# Patient Record
Sex: FEMALE | Race: BLACK OR AFRICAN AMERICAN | NOT HISPANIC OR LATINO | Employment: OTHER | URBAN - METROPOLITAN AREA
[De-identification: names, ages, dates, MRNs, and addresses within clinical notes are randomized per-mention and may not be internally consistent; named-entity substitution may affect disease eponyms.]

---

## 2020-09-16 ENCOUNTER — OFFICE VISIT (OUTPATIENT)
Dept: CARDIOLOGY | Facility: CLINIC | Age: 75
End: 2020-09-16
Payer: MEDICARE

## 2020-09-16 VITALS
WEIGHT: 130.06 LBS | DIASTOLIC BLOOD PRESSURE: 70 MMHG | HEART RATE: 70 BPM | SYSTOLIC BLOOD PRESSURE: 140 MMHG | HEIGHT: 61 IN | BODY MASS INDEX: 24.55 KG/M2

## 2020-09-16 DIAGNOSIS — I10 ESSENTIAL HYPERTENSION: ICD-10-CM

## 2020-09-16 DIAGNOSIS — E78.2 MIXED HYPERLIPIDEMIA: Primary | ICD-10-CM

## 2020-09-16 DIAGNOSIS — I73.9 PVD (PERIPHERAL VASCULAR DISEASE): ICD-10-CM

## 2020-09-16 DIAGNOSIS — Z86.73 HISTORY OF CEREBROVASCULAR ACCIDENT: ICD-10-CM

## 2020-09-16 PROBLEM — E78.5 HYPERLIPIDEMIA: Status: ACTIVE | Noted: 2018-05-01

## 2020-09-16 PROCEDURE — 99214 PR OFFICE/OUTPT VISIT, EST, LEVL IV, 30-39 MIN: ICD-10-PCS | Mod: S$PBB,25,, | Performed by: INTERNAL MEDICINE

## 2020-09-16 PROCEDURE — 93010 ELECTROCARDIOGRAM REPORT: CPT | Mod: 76,,, | Performed by: INTERNAL MEDICINE

## 2020-09-16 PROCEDURE — 99999 PR PBB SHADOW E&M-EST. PATIENT-LVL III: CPT | Mod: PBBFAC,,, | Performed by: INTERNAL MEDICINE

## 2020-09-16 PROCEDURE — 99214 OFFICE O/P EST MOD 30 MIN: CPT | Mod: S$PBB,25,, | Performed by: INTERNAL MEDICINE

## 2020-09-16 PROCEDURE — 93010 EKG 12-LEAD: ICD-10-PCS | Mod: 76,,, | Performed by: INTERNAL MEDICINE

## 2020-09-16 PROCEDURE — 93010 ELECTROCARDIOGRAM REPORT: CPT | Mod: S$PBB,,, | Performed by: INTERNAL MEDICINE

## 2020-09-16 PROCEDURE — 93005 ELECTROCARDIOGRAM TRACING: CPT

## 2020-09-16 PROCEDURE — 93005 ELECTROCARDIOGRAM TRACING: CPT | Mod: PBBFAC | Performed by: INTERNAL MEDICINE

## 2020-09-16 PROCEDURE — 99213 OFFICE O/P EST LOW 20 MIN: CPT | Mod: PBBFAC,25 | Performed by: INTERNAL MEDICINE

## 2020-09-16 PROCEDURE — 99999 PR PBB SHADOW E&M-EST. PATIENT-LVL III: ICD-10-PCS | Mod: PBBFAC,,, | Performed by: INTERNAL MEDICINE

## 2020-09-16 RX ORDER — TIMOLOL MALEATE 5 MG/ML
SOLUTION/ DROPS OPHTHALMIC
COMMUNITY
Start: 2020-09-04 | End: 2021-03-16

## 2020-09-16 RX ORDER — BRINZOLAMIDE 10 MG/ML
SUSPENSION/ DROPS OPHTHALMIC
COMMUNITY
Start: 2020-07-22 | End: 2021-03-16

## 2020-09-16 RX ORDER — AMLODIPINE BESYLATE 10 MG/1
10 TABLET ORAL DAILY
COMMUNITY
Start: 2020-08-22 | End: 2022-07-12

## 2020-09-16 RX ORDER — CLOPIDOGREL BISULFATE 75 MG/1
75 TABLET ORAL DAILY
COMMUNITY
Start: 2020-09-04 | End: 2023-12-13

## 2020-09-16 RX ORDER — MULTIVITAMIN
1 TABLET ORAL DAILY
COMMUNITY
End: 2023-12-13

## 2020-09-16 RX ORDER — LOSARTAN POTASSIUM 25 MG/1
25 TABLET ORAL DAILY
COMMUNITY
Start: 2020-08-21 | End: 2022-01-18

## 2020-09-16 RX ORDER — ATORVASTATIN CALCIUM 40 MG/1
40 TABLET, FILM COATED ORAL NIGHTLY
COMMUNITY
Start: 2020-08-21 | End: 2022-01-18

## 2020-09-16 RX ORDER — ASPIRIN 81 MG/1
81 TABLET ORAL DAILY
COMMUNITY
End: 2023-06-13

## 2020-09-16 NOTE — PROGRESS NOTES
Cardiology    9/16/2020  10:39 AM    Problem list  Patient Active Problem List   Diagnosis    Essential hypertension    History of cerebrovascular accident    Hyperlipidemia    PVD (peripheral vascular disease)       CC:  PVD    HPI:  Patient was last seen in November 2019.  In January 2020, she had a Doppler done which showed occluded cross femoral bypass.  Angiogram was done in January confirming occluded cross femoral bypass graft.  She was referred to Dr. Altamirano who saw her on 3/4/20 and recommended cilostazol with exercise regimen.  She has no new complaints.  She is able to walk at least 1 block before claudication.  She denies any rest claudication.  She still smokes intermittently.    Medications  Current Outpatient Medications   Medication Sig Dispense Refill    amLODIPine (NORVASC) 10 MG tablet 10 mg once daily.      aspirin (ECOTRIN) 81 MG EC tablet Take 81 mg by mouth once daily.      atorvastatin (LIPITOR) 40 MG tablet 40 mg every evening.      AZOPT 1 % ophthalmic suspension       clopidogreL (PLAVIX) 75 mg tablet 75 mg once daily.      losartan (COZAAR) 25 MG tablet 25 mg once daily.      multivitamin (ONE DAILY MULTIVITAMIN) per tablet Take 1 tablet by mouth once daily.      timolol maleate 0.5% (TIMOPTIC) 0.5 % Drop        No current facility-administered medications for this visit.       Prior to Admission medications    Medication Sig Start Date End Date Taking? Authorizing Provider   amLODIPine (NORVASC) 10 MG tablet 10 mg once daily. 8/22/20  Yes Historical Provider   aspirin (ECOTRIN) 81 MG EC tablet Take 81 mg by mouth once daily.   Yes Historical Provider   atorvastatin (LIPITOR) 40 MG tablet 40 mg every evening. 8/21/20  Yes Historical Provider   AZOPT 1 % ophthalmic suspension  7/22/20  Yes Historical Provider   clopidogreL (PLAVIX) 75 mg tablet 75 mg once daily. 9/4/20  Yes Historical Provider   losartan (COZAAR) 25 MG tablet 25 mg once daily. 8/21/20  Yes Historical  Provider   multivitamin (ONE DAILY MULTIVITAMIN) per tablet Take 1 tablet by mouth once daily.   Yes Historical Provider   timolol maleate 0.5% (TIMOPTIC) 0.5 % Drop  9/4/20  Yes Historical Provider         History  No past medical history on file.  No past surgical history on file.  Social History     Socioeconomic History    Marital status:      Spouse name: Not on file    Number of children: Not on file    Years of education: Not on file    Highest education level: Not on file   Occupational History    Not on file   Social Needs    Financial resource strain: Not on file    Food insecurity     Worry: Not on file     Inability: Not on file    Transportation needs     Medical: Not on file     Non-medical: Not on file   Tobacco Use    Smoking status: Current Some Day Smoker     Types: Cigarettes    Smokeless tobacco: Never Used   Substance and Sexual Activity    Alcohol use: Never     Frequency: Never    Drug use: Not on file    Sexual activity: Not on file   Lifestyle    Physical activity     Days per week: Not on file     Minutes per session: Not on file    Stress: Not on file   Relationships    Social connections     Talks on phone: Not on file     Gets together: Not on file     Attends Oriental orthodox service: Not on file     Active member of club or organization: Not on file     Attends meetings of clubs or organizations: Not on file     Relationship status: Not on file   Other Topics Concern    Not on file   Social History Narrative    Not on file         Allergies  Review of patient's allergies indicates:   Allergen Reactions    Penicillin          Review of Systems   Review of Systems   Constitution: Negative for decreased appetite, fever and weight loss.   HENT: Negative for congestion and nosebleeds.    Eyes: Negative for double vision, vision loss in left eye, vision loss in right eye and visual disturbance.   Cardiovascular: Negative for chest pain, claudication, cyanosis, dyspnea on  exertion, irregular heartbeat, leg swelling, near-syncope, orthopnea, palpitations, paroxysmal nocturnal dyspnea and syncope.   Respiratory: Negative for cough, hemoptysis, shortness of breath, sleep disturbances due to breathing, snoring, sputum production and wheezing.    Endocrine: Negative for cold intolerance and heat intolerance.   Skin: Negative for nail changes and rash.   Musculoskeletal: Negative for joint pain, muscle cramps, muscle weakness and myalgias.   Gastrointestinal: Negative for change in bowel habit, heartburn, hematemesis, hematochezia, hemorrhoids and melena.   Neurological: Negative for dizziness, focal weakness and headaches.         Physical Exam  Wt Readings from Last 1 Encounters:   09/16/20 59 kg (130 lb 1.1 oz)     BP Readings from Last 3 Encounters:   09/16/20 (!) 140/70     Pulse Readings from Last 1 Encounters:   No data found for Pulse     Body mass index is 24.58 kg/m².    Physical Exam   Constitutional: She is oriented to person, place, and time. She appears well-developed and well-nourished.   Neck: No JVD present. Carotid bruit is not present.   Cardiovascular: Normal rate, regular rhythm, S1 normal and S2 normal.   Pulses:       Carotid pulses are 2+ on the right side and 2+ on the left side.       Radial pulses are 2+ on the right side and 2+ on the left side.        Dorsalis pedis pulses are 0 on the right side and 0 on the left side.   Pulmonary/Chest: Breath sounds normal.   Musculoskeletal:         General: No edema.   Neurological: She is alert and oriented to person, place, and time.   Vitals reviewed.                Assessment  1. PVD (peripheral vascular disease)  Stable.  Occluded cross femoral bypass graft.  Evaluated by vascular surgery and recommended medical management.    2. Mixed hyperlipidemia  Stable    3. Essential hypertension  Unchanged    4. History of cerebrovascular accident  Stable        Plan and Discussion  Continue current medication.  Continue diet  and exercise regimen.  Again, recommend to stop smoking.    Follow Up  6 months    Time spent evaluating and treating patient 20 minutes with >50% of this time being face-to-face.     Johan Forte MD, F.A.C.C, F.S.C.A.I.

## 2021-03-16 ENCOUNTER — OFFICE VISIT (OUTPATIENT)
Dept: CARDIOLOGY | Facility: CLINIC | Age: 76
End: 2021-03-16
Payer: MEDICARE

## 2021-03-16 VITALS
HEART RATE: 94 BPM | SYSTOLIC BLOOD PRESSURE: 134 MMHG | DIASTOLIC BLOOD PRESSURE: 68 MMHG | BODY MASS INDEX: 24.22 KG/M2 | HEIGHT: 61 IN | OXYGEN SATURATION: 97 % | WEIGHT: 128.31 LBS

## 2021-03-16 DIAGNOSIS — I67.2 CEREBRAL ATHEROSCLEROSIS: ICD-10-CM

## 2021-03-16 DIAGNOSIS — I10 ESSENTIAL HYPERTENSION: ICD-10-CM

## 2021-03-16 DIAGNOSIS — Z86.73 HISTORY OF CEREBROVASCULAR ACCIDENT: ICD-10-CM

## 2021-03-16 DIAGNOSIS — I73.9 PVD (PERIPHERAL VASCULAR DISEASE): Primary | ICD-10-CM

## 2021-03-16 DIAGNOSIS — E78.2 MIXED HYPERLIPIDEMIA: ICD-10-CM

## 2021-03-16 PROCEDURE — 93010 EKG 12-LEAD: ICD-10-PCS | Mod: S$PBB,,, | Performed by: INTERNAL MEDICINE

## 2021-03-16 PROCEDURE — 93010 ELECTROCARDIOGRAM REPORT: CPT | Mod: S$PBB,,, | Performed by: INTERNAL MEDICINE

## 2021-03-16 PROCEDURE — 99214 OFFICE O/P EST MOD 30 MIN: CPT | Mod: S$PBB,,, | Performed by: INTERNAL MEDICINE

## 2021-03-16 PROCEDURE — 99999 PR PBB SHADOW E&M-EST. PATIENT-LVL III: ICD-10-PCS | Mod: PBBFAC,,, | Performed by: INTERNAL MEDICINE

## 2021-03-16 PROCEDURE — 99213 OFFICE O/P EST LOW 20 MIN: CPT | Mod: PBBFAC,PN,25 | Performed by: INTERNAL MEDICINE

## 2021-03-16 PROCEDURE — 93005 ELECTROCARDIOGRAM TRACING: CPT | Mod: PBBFAC,PN | Performed by: INTERNAL MEDICINE

## 2021-03-16 PROCEDURE — 99214 PR OFFICE/OUTPT VISIT, EST, LEVL IV, 30-39 MIN: ICD-10-PCS | Mod: S$PBB,,, | Performed by: INTERNAL MEDICINE

## 2021-03-16 PROCEDURE — 99999 PR PBB SHADOW E&M-EST. PATIENT-LVL III: CPT | Mod: PBBFAC,,, | Performed by: INTERNAL MEDICINE

## 2021-03-16 RX ORDER — DORZOLAMIDE HYDROCHLORIDE AND TIMOLOL MALEATE 20; 5 MG/ML; MG/ML
1 SOLUTION/ DROPS OPHTHALMIC
COMMUNITY
End: 2022-01-18

## 2021-03-16 RX ORDER — CILOSTAZOL 50 MG/1
50 TABLET ORAL 2 TIMES DAILY
COMMUNITY
Start: 2020-09-28 | End: 2022-01-18

## 2021-09-03 ENCOUNTER — TELEPHONE (OUTPATIENT)
Dept: CARDIOLOGY | Facility: CLINIC | Age: 76
End: 2021-09-03

## 2022-01-18 ENCOUNTER — OFFICE VISIT (OUTPATIENT)
Dept: CARDIOLOGY | Facility: CLINIC | Age: 77
End: 2022-01-18
Payer: MEDICARE

## 2022-01-18 VITALS
HEART RATE: 66 BPM | BODY MASS INDEX: 23.77 KG/M2 | WEIGHT: 125.88 LBS | SYSTOLIC BLOOD PRESSURE: 134 MMHG | OXYGEN SATURATION: 99 % | DIASTOLIC BLOOD PRESSURE: 62 MMHG | HEIGHT: 61 IN

## 2022-01-18 DIAGNOSIS — I10 ESSENTIAL HYPERTENSION: ICD-10-CM

## 2022-01-18 DIAGNOSIS — I73.9 PVD (PERIPHERAL VASCULAR DISEASE): Primary | ICD-10-CM

## 2022-01-18 DIAGNOSIS — E78.2 MIXED HYPERLIPIDEMIA: ICD-10-CM

## 2022-01-18 PROCEDURE — 99999 PR PBB SHADOW E&M-EST. PATIENT-LVL III: ICD-10-PCS | Mod: PBBFAC,,, | Performed by: INTERNAL MEDICINE

## 2022-01-18 PROCEDURE — 99214 OFFICE O/P EST MOD 30 MIN: CPT | Mod: S$PBB,,, | Performed by: INTERNAL MEDICINE

## 2022-01-18 PROCEDURE — 99214 PR OFFICE/OUTPT VISIT, EST, LEVL IV, 30-39 MIN: ICD-10-PCS | Mod: S$PBB,,, | Performed by: INTERNAL MEDICINE

## 2022-01-18 PROCEDURE — 99213 OFFICE O/P EST LOW 20 MIN: CPT | Mod: PBBFAC,PN | Performed by: INTERNAL MEDICINE

## 2022-01-18 PROCEDURE — 99999 PR PBB SHADOW E&M-EST. PATIENT-LVL III: CPT | Mod: PBBFAC,,, | Performed by: INTERNAL MEDICINE

## 2022-01-18 PROCEDURE — 93010 ELECTROCARDIOGRAM REPORT: CPT | Mod: S$PBB,,, | Performed by: INTERNAL MEDICINE

## 2022-01-18 PROCEDURE — 93010 EKG 12-LEAD: ICD-10-PCS | Mod: S$PBB,,, | Performed by: INTERNAL MEDICINE

## 2022-01-18 PROCEDURE — 93005 ELECTROCARDIOGRAM TRACING: CPT | Mod: PBBFAC,PN | Performed by: INTERNAL MEDICINE

## 2022-01-18 RX ORDER — LOSARTAN POTASSIUM 100 MG/1
100 TABLET ORAL DAILY
COMMUNITY

## 2022-01-18 RX ORDER — TIMOLOL MALEATE 5 MG/ML
SOLUTION/ DROPS OPHTHALMIC
COMMUNITY
Start: 2021-12-16 | End: 2023-12-13

## 2022-01-18 RX ORDER — BRIMONIDINE TARTRATE 2 MG/ML
SOLUTION/ DROPS OPHTHALMIC
COMMUNITY
Start: 2021-12-16 | End: 2023-04-12

## 2022-01-18 RX ORDER — LATANOPROST 50 UG/ML
SOLUTION/ DROPS OPHTHALMIC
COMMUNITY
Start: 2021-12-16 | End: 2023-12-13

## 2022-01-18 NOTE — PROGRESS NOTES
Cardiology    1/18/2022  9:50 AM    Problem list  Patient Active Problem List   Diagnosis    Essential hypertension    History of cerebrovascular accident    Hyperlipidemia    PVD (peripheral vascular disease)       CC:  No complaints    HPI:  Last seen in March 2021.  Quit smoking.  No CP, SOB, PND or worsening claudication.  No bleeding.  Did not get tests done.    Medications  Current Outpatient Medications   Medication Sig Dispense Refill    amLODIPine (NORVASC) 10 MG tablet 10 mg once daily.      aspirin (ECOTRIN) 81 MG EC tablet Take 81 mg by mouth once daily.      brimonidine 0.2% (ALPHAGAN) 0.2 % Drop Place into both eyes.      clopidogreL (PLAVIX) 75 mg tablet 75 mg once daily.      latanoprost 0.005 % ophthalmic solution Place into both eyes.      losartan (COZAAR) 100 MG tablet Take 100 mg by mouth once daily.      multivitamin (THERAGRAN) per tablet Take 1 tablet by mouth once daily.      timolol maleate 0.5% (TIMOPTIC) 0.5 % Drop Place into both eyes.       No current facility-administered medications for this visit.      Prior to Admission medications    Medication Sig Start Date End Date Taking? Authorizing Provider   amLODIPine (NORVASC) 10 MG tablet 10 mg once daily. 8/22/20  Yes Historical Provider   aspirin (ECOTRIN) 81 MG EC tablet Take 81 mg by mouth once daily.   Yes Historical Provider   brimonidine 0.2% (ALPHAGAN) 0.2 % Drop Place into both eyes. 12/16/21  Yes Historical Provider   clopidogreL (PLAVIX) 75 mg tablet 75 mg once daily. 9/4/20  Yes Historical Provider   latanoprost 0.005 % ophthalmic solution Place into both eyes. 12/16/21  Yes Historical Provider   losartan (COZAAR) 100 MG tablet Take 100 mg by mouth once daily.   Yes Historical Provider   multivitamin (THERAGRAN) per tablet Take 1 tablet by mouth once daily.   Yes Historical Provider   timolol maleate 0.5% (TIMOPTIC) 0.5 % Drop Place into both eyes. 12/16/21  Yes Historical Provider   dorzolamide-timolol  2-0.5% (COSOPT) 22.3-6.8 mg/mL ophthalmic solution Apply 1 drop to eye.  1/18/22 Yes Historical Provider   atorvastatin (LIPITOR) 40 MG tablet 40 mg every evening. 8/21/20 1/18/22  Historical Provider   cilostazoL (PLETAL) 50 MG Tab Take 50 mg by mouth 2 (two) times a day. 9/28/20 1/18/22  Historical Provider   losartan (COZAAR) 25 MG tablet 25 mg once daily. 8/21/20 1/18/22  Historical Provider         History  No past medical history on file.  No past surgical history on file.  Social History     Socioeconomic History    Marital status:    Tobacco Use    Smoking status: Former Smoker     Types: Cigarettes    Smokeless tobacco: Never Used   Substance and Sexual Activity    Alcohol use: Never         Allergies  Review of patient's allergies indicates:   Allergen Reactions    Penicillin          Review of Systems   Review of Systems   Constitutional: Negative for decreased appetite, fever and weight loss.   HENT: Negative for congestion and nosebleeds.    Eyes: Negative for double vision, vision loss in left eye, vision loss in right eye and visual disturbance.   Cardiovascular: Negative for chest pain, claudication, cyanosis, dyspnea on exertion, irregular heartbeat, leg swelling, near-syncope, orthopnea, palpitations, paroxysmal nocturnal dyspnea and syncope.   Respiratory: Negative for cough, hemoptysis, shortness of breath, sleep disturbances due to breathing, snoring, sputum production and wheezing.    Endocrine: Negative for cold intolerance and heat intolerance.   Skin: Negative for nail changes and rash.   Musculoskeletal: Negative for joint pain, muscle cramps, muscle weakness and myalgias.   Gastrointestinal: Negative for change in bowel habit, heartburn, hematemesis, hematochezia, hemorrhoids and melena.   Neurological: Negative for dizziness, focal weakness and headaches.         Physical Exam  Wt Readings from Last 1 Encounters:   01/18/22 57.1 kg (125 lb 14.1 oz)     BP Readings from Last 3  Encounters:   01/18/22 134/62   03/16/21 134/68   09/16/20 (!) 140/70     Pulse Readings from Last 1 Encounters:   01/18/22 66     Body mass index is 23.79 kg/m².    Physical Exam  Vitals reviewed.   Constitutional:       Appearance: She is well-developed.   Neck:      Vascular: No carotid bruit or JVD.   Cardiovascular:      Rate and Rhythm: Normal rate and regular rhythm.      Pulses:           Carotid pulses are 2+ on the right side and 2+ on the left side.       Radial pulses are 2+ on the right side and 2+ on the left side.        Dorsalis pedis pulses are 0 on the right side and 0 on the left side.      Heart sounds: S1 normal and S2 normal.   Pulmonary:      Breath sounds: Normal breath sounds.   Neurological:      Mental Status: She is alert and oriented to person, place, and time.                   Assessment  1. PVD (peripheral vascular disease)  stable    2. Mixed hyperlipidemia  stable    3. Essential hypertension  Controlled on meds.        Plan and Discussion  Continue current meds.      Follow Up  6 months      Johan Forte MD, F.A.C.C, F.S.C.A.I.

## 2022-03-08 ENCOUNTER — HOSPITAL ENCOUNTER (OUTPATIENT)
Dept: CARDIOLOGY | Facility: HOSPITAL | Age: 77
Discharge: HOME | End: 2022-03-08
Attending: INTERNAL MEDICINE
Payer: MEDICARE

## 2022-03-08 VITALS
HEIGHT: 61 IN | SYSTOLIC BLOOD PRESSURE: 134 MMHG | BODY MASS INDEX: 23.6 KG/M2 | DIASTOLIC BLOOD PRESSURE: 62 MMHG | WEIGHT: 125 LBS

## 2022-03-08 DIAGNOSIS — Z86.73 HISTORY OF CEREBROVASCULAR ACCIDENT: ICD-10-CM

## 2022-03-08 DIAGNOSIS — I67.2 CEREBRAL ATHEROSCLEROSIS: ICD-10-CM

## 2022-03-08 DIAGNOSIS — I10 ESSENTIAL HYPERTENSION: ICD-10-CM

## 2022-03-08 PROCEDURE — 93880 EXTRACRANIAL BILAT STUDY: CPT | Mod: 26,,, | Performed by: INTERNAL MEDICINE

## 2022-03-08 PROCEDURE — 93880 CV US DOPPLER CAROTID (CUPID ONLY): ICD-10-PCS | Mod: 26,,, | Performed by: INTERNAL MEDICINE

## 2022-03-08 PROCEDURE — 93880 EXTRACRANIAL BILAT STUDY: CPT | Mod: PN

## 2022-03-08 PROCEDURE — 93306 ECHO (CUPID ONLY): ICD-10-PCS | Mod: 26,,, | Performed by: INTERNAL MEDICINE

## 2022-03-08 PROCEDURE — 93306 TTE W/DOPPLER COMPLETE: CPT | Mod: PN

## 2022-03-08 PROCEDURE — 93306 TTE W/DOPPLER COMPLETE: CPT | Mod: 26,,, | Performed by: INTERNAL MEDICINE

## 2022-03-09 LAB
AV INDEX (PROSTH): 0.88
AV MEAN GRADIENT: 4 MMHG
AV PEAK GRADIENT: 7 MMHG
AV VALVE AREA: 2.72 CM2
AV VELOCITY RATIO: 0.97
BSA FOR ECHO PROCEDURE: 1.56 M2
CV ECHO LV RWT: 0.4 CM
DOP CALC AO PEAK VEL: 1.34 M/S
DOP CALC AO VTI: 29.03 CM
DOP CALC LVOT AREA: 3.1 CM2
DOP CALC LVOT DIAMETER: 1.98 CM
DOP CALC LVOT PEAK VEL: 1.3 M/S
DOP CALC LVOT STROKE VOLUME: 78.85 CM3
DOP CALCLVOT PEAK VEL VTI: 25.62 CM
E WAVE DECELERATION TIME: 163.42 MSEC
E/A RATIO: 0.85
E/E' RATIO: 16 M/S
ECHO LV POSTERIOR WALL: 0.76 CM (ref 0.6–1.1)
EJECTION FRACTION: 61 %
FRACTIONAL SHORTENING: 32 % (ref 28–44)
INTERVENTRICULAR SEPTUM: 0.91 CM (ref 0.6–1.1)
IVRT: 88.49 MSEC
LA MAJOR: 5.88 CM
LA MINOR: 5.95 CM
LA WIDTH: 4.56 CM
LEFT ARM DIASTOLIC BLOOD PRESSURE: 65 MMHG
LEFT ARM SYSTOLIC BLOOD PRESSURE: 135 MMHG
LEFT ATRIUM SIZE: 3.1 CM
LEFT ATRIUM VOLUME INDEX MOD: 41.9 ML/M2
LEFT ATRIUM VOLUME INDEX: 45.9 ML/M2
LEFT ATRIUM VOLUME MOD: 65 CM3
LEFT ATRIUM VOLUME: 71.07 CM3
LEFT CBA DIAS: 10 CM/S
LEFT CBA SYS: 61 CM/S
LEFT CCA DIST DIAS: 14 CM/S
LEFT CCA DIST SYS: 91 CM/S
LEFT CCA MID DIAS: 9 CM/S
LEFT CCA MID SYS: 84 CM/S
LEFT CCA PROX DIAS: 17 CM/S
LEFT CCA PROX SYS: 95 CM/S
LEFT ECA DIAS: 16 CM/S
LEFT ECA SYS: 134 CM/S
LEFT ICA DIST DIAS: 14 CM/S
LEFT ICA DIST SYS: 88 CM/S
LEFT ICA MID DIAS: 15 CM/S
LEFT ICA MID SYS: 90 CM/S
LEFT ICA PROX DIAS: 20 CM/S
LEFT ICA PROX SYS: 130 CM/S
LEFT INTERNAL DIMENSION IN SYSTOLE: 2.6 CM (ref 2.1–4)
LEFT VENTRICLE DIASTOLIC VOLUME INDEX: 40.5 ML/M2
LEFT VENTRICLE DIASTOLIC VOLUME: 62.78 ML
LEFT VENTRICLE MASS INDEX: 59 G/M2
LEFT VENTRICLE SYSTOLIC VOLUME INDEX: 15.9 ML/M2
LEFT VENTRICLE SYSTOLIC VOLUME: 24.72 ML
LEFT VENTRICULAR INTERNAL DIMENSION IN DIASTOLE: 3.82 CM (ref 3.5–6)
LEFT VENTRICULAR MASS: 91.89 G
LEFT VERTEBRAL DIAS: 16 CM/S
LEFT VERTEBRAL SYS: 79 CM/S
LV LATERAL E/E' RATIO: 14.86 M/S
LV SEPTAL E/E' RATIO: 17.33 M/S
MV A" WAVE DURATION": 9.71 MSEC
MV PEAK A VEL: 1.23 M/S
MV PEAK E VEL: 1.04 M/S
MV STENOSIS PRESSURE HALF TIME: 47.39 MS
MV VALVE AREA P 1/2 METHOD: 4.64 CM2
OHS CV CAROTID RIGHT ICA EDV HIGHEST: 10
OHS CV CAROTID ULTRASOUND LEFT ICA/CCA RATIO: 1.43
OHS CV CAROTID ULTRASOUND RIGHT ICA/CCA RATIO: 1
OHS CV PV CAROTID LEFT HIGHEST CCA: 95
OHS CV PV CAROTID LEFT HIGHEST ICA: 130
OHS CV PV CAROTID RIGHT HIGHEST CCA: 100
OHS CV PV CAROTID RIGHT HIGHEST ICA: 81
OHS CV US CAROTID LEFT HIGHEST EDV: 20
PISA TR MAX VEL: 2.91 M/S
PULM VEIN S/D RATIO: 1.73
PV PEAK D VEL: 0.44 M/S
PV PEAK S VEL: 0.76 M/S
PV PEAK VELOCITY: 0.79 CM/S
RA MAJOR: 4.72 CM
RA PRESSURE: 3 MMHG
RA WIDTH: 3.45 CM
RIGHT ARM DIASTOLIC BLOOD PRESSURE: 70 MMHG
RIGHT ARM SYSTOLIC BLOOD PRESSURE: 120 MMHG
RIGHT CBA DIAS: 12 CM/S
RIGHT CBA SYS: 82 CM/S
RIGHT CCA DIST DIAS: 10 CM/S
RIGHT CCA DIST SYS: 81 CM/S
RIGHT CCA MID DIAS: 13 CM/S
RIGHT CCA MID SYS: 79 CM/S
RIGHT CCA PROX DIAS: 11 CM/S
RIGHT CCA PROX SYS: 100 CM/S
RIGHT ECA DIAS: 2 CM/S
RIGHT ECA SYS: 81 CM/S
RIGHT ICA DIST DIAS: 9 CM/S
RIGHT ICA DIST SYS: 61 CM/S
RIGHT ICA MID DIAS: 7 CM/S
RIGHT ICA MID SYS: 64 CM/S
RIGHT ICA PROX DIAS: 10 CM/S
RIGHT ICA PROX SYS: 81 CM/S
RIGHT VENTRICULAR END-DIASTOLIC DIMENSION: 3.5 CM
RIGHT VERTEBRAL DIAS: 10 CM/S
RIGHT VERTEBRAL SYS: 50 CM/S
RV TISSUE DOPPLER FREE WALL SYSTOLIC VELOCITY 1 (APICAL 4 CHAMBER VIEW): 10.33 CM/S
SINUS: 2.71 CM
STJ: 2.76 CM
TDI LATERAL: 0.07 M/S
TDI SEPTAL: 0.06 M/S
TDI: 0.07 M/S
TR MAX PG: 34 MMHG
TRICUSPID ANNULAR PLANE SYSTOLIC EXCURSION: 1.72 CM
TV REST PULMONARY ARTERY PRESSURE: 37 MMHG

## 2022-07-12 ENCOUNTER — OFFICE VISIT (OUTPATIENT)
Dept: CARDIOLOGY | Facility: CLINIC | Age: 77
End: 2022-07-12
Payer: MEDICARE

## 2022-07-12 VITALS
BODY MASS INDEX: 23.25 KG/M2 | HEART RATE: 81 BPM | OXYGEN SATURATION: 99 % | WEIGHT: 123.13 LBS | DIASTOLIC BLOOD PRESSURE: 64 MMHG | HEIGHT: 61 IN | SYSTOLIC BLOOD PRESSURE: 128 MMHG

## 2022-07-12 DIAGNOSIS — I10 ESSENTIAL HYPERTENSION: ICD-10-CM

## 2022-07-12 DIAGNOSIS — I73.9 PVD (PERIPHERAL VASCULAR DISEASE): Primary | ICD-10-CM

## 2022-07-12 DIAGNOSIS — E78.2 MIXED HYPERLIPIDEMIA: ICD-10-CM

## 2022-07-12 PROCEDURE — 99999 PR PBB SHADOW E&M-EST. PATIENT-LVL III: CPT | Mod: PBBFAC,,, | Performed by: INTERNAL MEDICINE

## 2022-07-12 PROCEDURE — 99214 OFFICE O/P EST MOD 30 MIN: CPT | Mod: S$PBB,,, | Performed by: INTERNAL MEDICINE

## 2022-07-12 PROCEDURE — 99999 PR PBB SHADOW E&M-EST. PATIENT-LVL III: ICD-10-PCS | Mod: PBBFAC,,, | Performed by: INTERNAL MEDICINE

## 2022-07-12 PROCEDURE — 99214 PR OFFICE/OUTPT VISIT, EST, LEVL IV, 30-39 MIN: ICD-10-PCS | Mod: S$PBB,,, | Performed by: INTERNAL MEDICINE

## 2022-07-12 PROCEDURE — 99213 OFFICE O/P EST LOW 20 MIN: CPT | Mod: PBBFAC,PN | Performed by: INTERNAL MEDICINE

## 2022-07-12 RX ORDER — MELOXICAM 15 MG/1
15 TABLET ORAL DAILY PRN
COMMUNITY
End: 2023-12-13

## 2022-07-12 RX ORDER — CILOSTAZOL 50 MG/1
50 TABLET ORAL DAILY
COMMUNITY
Start: 2022-07-08 | End: 2023-12-13

## 2022-07-12 RX ORDER — DILTIAZEM HYDROCHLORIDE 300 MG/1
300 CAPSULE, COATED, EXTENDED RELEASE ORAL DAILY
COMMUNITY
Start: 2022-06-30

## 2022-07-12 NOTE — PROGRESS NOTES
Cardiology    7/12/2022  9:41 AM    Problem list  Patient Active Problem List   Diagnosis    Essential hypertension    History of cerebrovascular accident    Hyperlipidemia    PVD (peripheral vascular disease)       CC:  f/u    HPI:  Doing fine.  Discussed her echo and carotid results in March.  Not walking for exercise.  No angina, SOB, PND, worsening claudication.    Medications  Current Outpatient Medications   Medication Sig Dispense Refill    aspirin (ECOTRIN) 81 MG EC tablet Take 81 mg by mouth once daily.      brimonidine 0.2% (ALPHAGAN) 0.2 % Drop Place into both eyes.      cilostazoL (PLETAL) 50 MG Tab Take 50 mg by mouth once daily.      clopidogreL (PLAVIX) 75 mg tablet 75 mg once daily.      diltiaZEM (CARDIZEM CD) 300 MG 24 hr capsule Take 300 mg by mouth once daily.      latanoprost 0.005 % ophthalmic solution Place into both eyes.      losartan (COZAAR) 100 MG tablet Take 100 mg by mouth once daily.      meloxicam (MOBIC) 15 MG tablet Take 15 mg by mouth daily as needed for Pain.      multivitamin (THERAGRAN) per tablet Take 1 tablet by mouth once daily.      timolol maleate 0.5% (TIMOPTIC) 0.5 % Drop Place into both eyes.       No current facility-administered medications for this visit.      Prior to Admission medications    Medication Sig Start Date End Date Taking? Authorizing Provider   aspirin (ECOTRIN) 81 MG EC tablet Take 81 mg by mouth once daily.   Yes Historical Provider   brimonidine 0.2% (ALPHAGAN) 0.2 % Drop Place into both eyes. 12/16/21  Yes Historical Provider   cilostazoL (PLETAL) 50 MG Tab Take 50 mg by mouth once daily. 7/8/22  Yes Historical Provider   clopidogreL (PLAVIX) 75 mg tablet 75 mg once daily. 9/4/20  Yes Historical Provider   diltiaZEM (CARDIZEM CD) 300 MG 24 hr capsule Take 300 mg by mouth once daily. 6/30/22  Yes Historical Provider   latanoprost 0.005 % ophthalmic solution Place into both eyes. 12/16/21  Yes Historical Provider   losartan  (COZAAR) 100 MG tablet Take 100 mg by mouth once daily.   Yes Historical Provider   meloxicam (MOBIC) 15 MG tablet Take 15 mg by mouth daily as needed for Pain.   Yes Historical Provider   multivitamin (THERAGRAN) per tablet Take 1 tablet by mouth once daily.   Yes Historical Provider   timolol maleate 0.5% (TIMOPTIC) 0.5 % Drop Place into both eyes. 12/16/21  Yes Historical Provider   amLODIPine (NORVASC) 10 MG tablet 10 mg once daily. 8/22/20 7/12/22  Historical Provider         History  No past medical history on file.  No past surgical history on file.  Social History     Socioeconomic History    Marital status:    Tobacco Use    Smoking status: Former Smoker     Types: Cigarettes    Smokeless tobacco: Never Used   Substance and Sexual Activity    Alcohol use: Never         Allergies  Review of patient's allergies indicates:   Allergen Reactions    Penicillin          Review of Systems   Review of Systems   Constitutional: Negative for decreased appetite, fever and weight loss.   HENT: Negative for congestion and nosebleeds.    Eyes: Negative for double vision, vision loss in left eye, vision loss in right eye and visual disturbance.   Cardiovascular: Negative for chest pain, claudication, cyanosis, dyspnea on exertion, irregular heartbeat, leg swelling, near-syncope, orthopnea, palpitations, paroxysmal nocturnal dyspnea and syncope.   Respiratory: Negative for cough, hemoptysis, shortness of breath, sleep disturbances due to breathing, snoring, sputum production and wheezing.    Endocrine: Negative for cold intolerance and heat intolerance.   Skin: Negative for nail changes and rash.   Musculoskeletal: Negative for joint pain, muscle cramps, muscle weakness and myalgias.   Gastrointestinal: Negative for change in bowel habit, heartburn, hematemesis, hematochezia, hemorrhoids and melena.   Neurological: Negative for dizziness, focal weakness and headaches.         Physical Exam  Wt Readings from Last  1 Encounters:   07/12/22 55.8 kg (123 lb 2 oz)     BP Readings from Last 3 Encounters:   07/12/22 128/64   03/08/22 134/62   01/18/22 134/62     Pulse Readings from Last 1 Encounters:   07/12/22 81     Body mass index is 23.26 kg/m².    Physical Exam  Vitals reviewed.   Constitutional:       Appearance: She is well-developed.   Neck:      Vascular: No carotid bruit or JVD.   Cardiovascular:      Rate and Rhythm: Normal rate and regular rhythm.      Pulses:           Carotid pulses are 2+ on the right side and 2+ on the left side.       Radial pulses are 2+ on the right side and 2+ on the left side.        Dorsalis pedis pulses are 0 on the right side and 0 on the left side.      Heart sounds: S1 normal and S2 normal.   Pulmonary:      Breath sounds: Normal breath sounds.   Neurological:      Mental Status: She is alert and oriented to person, place, and time.             Assessment  1. PVD (peripheral vascular disease)  stable    2. Mixed hyperlipidemia  stable    3. Essential hypertension  controlled        Plan and Discussion  Continue current meds.  Encourage to exercise at least 30 minutes per day, 5 days per week.      Follow Up  6 months      Johan Forte MD, F.A.C.C, F.S.C.A.I.

## 2023-04-12 ENCOUNTER — OFFICE VISIT (OUTPATIENT)
Dept: CARDIOLOGY | Facility: CLINIC | Age: 78
End: 2023-04-12
Payer: MEDICARE

## 2023-04-12 VITALS
HEART RATE: 58 BPM | OXYGEN SATURATION: 97 % | BODY MASS INDEX: 23.08 KG/M2 | HEIGHT: 61 IN | SYSTOLIC BLOOD PRESSURE: 126 MMHG | WEIGHT: 122.25 LBS | DIASTOLIC BLOOD PRESSURE: 58 MMHG

## 2023-04-12 DIAGNOSIS — I10 ESSENTIAL HYPERTENSION: ICD-10-CM

## 2023-04-12 DIAGNOSIS — I73.9 PVD (PERIPHERAL VASCULAR DISEASE): Primary | ICD-10-CM

## 2023-04-12 DIAGNOSIS — I48.20 CHRONIC ATRIAL FIBRILLATION: Primary | ICD-10-CM

## 2023-04-12 DIAGNOSIS — I48.20 CHRONIC ATRIAL FIBRILLATION: ICD-10-CM

## 2023-04-12 DIAGNOSIS — Z86.73 HISTORY OF CEREBROVASCULAR ACCIDENT: ICD-10-CM

## 2023-04-12 DIAGNOSIS — E78.2 MIXED HYPERLIPIDEMIA: ICD-10-CM

## 2023-04-12 PROCEDURE — 99214 OFFICE O/P EST MOD 30 MIN: CPT | Mod: S$PBB,,, | Performed by: INTERNAL MEDICINE

## 2023-04-12 PROCEDURE — 93005 ELECTROCARDIOGRAM TRACING: CPT | Mod: PBBFAC,PN | Performed by: INTERNAL MEDICINE

## 2023-04-12 PROCEDURE — 99999 PR PBB SHADOW E&M-EST. PATIENT-LVL IV: CPT | Mod: PBBFAC,,, | Performed by: INTERNAL MEDICINE

## 2023-04-12 PROCEDURE — 93010 EKG 12-LEAD: ICD-10-PCS | Mod: S$PBB,,, | Performed by: INTERNAL MEDICINE

## 2023-04-12 PROCEDURE — 99214 PR OFFICE/OUTPT VISIT, EST, LEVL IV, 30-39 MIN: ICD-10-PCS | Mod: S$PBB,,, | Performed by: INTERNAL MEDICINE

## 2023-04-12 PROCEDURE — 99214 OFFICE O/P EST MOD 30 MIN: CPT | Mod: PBBFAC,PN | Performed by: INTERNAL MEDICINE

## 2023-04-12 PROCEDURE — 93010 ELECTROCARDIOGRAM REPORT: CPT | Mod: S$PBB,,, | Performed by: INTERNAL MEDICINE

## 2023-04-12 PROCEDURE — 99999 PR PBB SHADOW E&M-EST. PATIENT-LVL IV: ICD-10-PCS | Mod: PBBFAC,,, | Performed by: INTERNAL MEDICINE

## 2023-04-12 RX ORDER — GABAPENTIN 100 MG/1
200 CAPSULE ORAL 2 TIMES DAILY
COMMUNITY
Start: 2023-04-05 | End: 2023-12-13

## 2023-04-12 NOTE — PROGRESS NOTES
Cardiology    4/12/2023  2:19 PM    Problem list  Patient Active Problem List   Diagnosis    Essential hypertension    History of cerebrovascular accident    Hyperlipidemia    PVD (peripheral vascular disease)    Chronic atrial fibrillation       CC:  F/u    HPI:  She is here for routine follow-up.  She denies any chest pain, shortness of breath, palpitation or syncope.  She complains of right foot pain.    Medications  Current Outpatient Medications   Medication Sig Dispense Refill    aspirin (ECOTRIN) 81 MG EC tablet Take 81 mg by mouth once daily.      cilostazoL (PLETAL) 50 MG Tab Take 50 mg by mouth once daily.      clopidogreL (PLAVIX) 75 mg tablet 75 mg once daily.      diltiaZEM (CARDIZEM CD) 300 MG 24 hr capsule Take 300 mg by mouth once daily.      gabapentin (NEURONTIN) 100 MG capsule Take 200 mg by mouth 2 (two) times daily.      latanoprost 0.005 % ophthalmic solution Place into both eyes.      losartan (COZAAR) 100 MG tablet Take 100 mg by mouth once daily.      timolol maleate 0.5% (TIMOPTIC) 0.5 % Drop Place into both eyes.      meloxicam (MOBIC) 15 MG tablet Take 15 mg by mouth daily as needed for Pain.      multivitamin (THERAGRAN) per tablet Take 1 tablet by mouth once daily.       No current facility-administered medications for this visit.      Prior to Admission medications    Medication Sig Start Date End Date Taking? Authorizing Provider   aspirin (ECOTRIN) 81 MG EC tablet Take 81 mg by mouth once daily.   Yes Historical Provider   cilostazoL (PLETAL) 50 MG Tab Take 50 mg by mouth once daily. 7/8/22  Yes Historical Provider   clopidogreL (PLAVIX) 75 mg tablet 75 mg once daily. 9/4/20  Yes Historical Provider   diltiaZEM (CARDIZEM CD) 300 MG 24 hr capsule Take 300 mg by mouth once daily. 6/30/22  Yes Historical Provider   gabapentin (NEURONTIN) 100 MG capsule Take 200 mg by mouth 2 (two) times daily. 4/5/23  Yes Historical Provider   latanoprost 0.005 % ophthalmic solution Place into  both eyes. 12/16/21  Yes Historical Provider   losartan (COZAAR) 100 MG tablet Take 100 mg by mouth once daily.   Yes Historical Provider   timolol maleate 0.5% (TIMOPTIC) 0.5 % Drop Place into both eyes. 12/16/21  Yes Historical Provider   meloxicam (MOBIC) 15 MG tablet Take 15 mg by mouth daily as needed for Pain.    Historical Provider   multivitamin (THERAGRAN) per tablet Take 1 tablet by mouth once daily.    Historical Provider   brimonidine 0.2% (ALPHAGAN) 0.2 % Drop Place into both eyes. 12/16/21 4/12/23  Historical Provider         History  No past medical history on file.  No past surgical history on file.  Social History     Socioeconomic History    Marital status:    Tobacco Use    Smoking status: Former     Types: Cigarettes    Smokeless tobacco: Never   Substance and Sexual Activity    Alcohol use: Never         Allergies  Review of patient's allergies indicates:   Allergen Reactions    Penicillin          Review of Systems   Review of Systems   Constitutional: Negative for decreased appetite, fever and weight loss.   HENT:  Negative for congestion and nosebleeds.    Eyes:  Negative for double vision, vision loss in left eye, vision loss in right eye and visual disturbance.   Cardiovascular:  Negative for chest pain, claudication, cyanosis, dyspnea on exertion, irregular heartbeat, leg swelling, near-syncope, orthopnea, palpitations, paroxysmal nocturnal dyspnea and syncope.   Respiratory:  Negative for cough, hemoptysis, shortness of breath, sleep disturbances due to breathing, snoring, sputum production and wheezing.    Endocrine: Negative for cold intolerance and heat intolerance.   Skin:  Negative for nail changes and rash.   Musculoskeletal:  Negative for joint pain, muscle cramps, muscle weakness and myalgias.   Gastrointestinal:  Negative for change in bowel habit, heartburn, hematemesis, hematochezia, hemorrhoids and melena.   Neurological:  Negative for dizziness, focal weakness and  headaches.       Physical Exam  Wt Readings from Last 1 Encounters:   04/12/23 55.4 kg (122 lb 3.9 oz)     BP Readings from Last 3 Encounters:   04/12/23 (!) 126/58   07/12/22 128/64   03/08/22 134/62     Pulse Readings from Last 1 Encounters:   04/12/23 (!) 58     Body mass index is 23.1 kg/m².    Physical Exam  Vitals reviewed.   Constitutional:       Appearance: She is well-developed.   Neck:      Vascular: No carotid bruit or JVD.   Cardiovascular:      Rate and Rhythm: Normal rate. Rhythm irregularly irregular.      Pulses:           Carotid pulses are 2+ on the right side and 2+ on the left side.       Radial pulses are 2+ on the right side and 2+ on the left side.        Dorsalis pedis pulses are 0 on the right side and 0 on the left side.      Heart sounds: S1 normal and S2 normal.   Pulmonary:      Breath sounds: Normal breath sounds.   Neurological:      Mental Status: She is alert and oriented to person, place, and time.       [id number] - VASc score:  Congestive Heart Failure or EF < 35% NO   Hypertension YES  +1   Age >= 75 YES  +1   Diabetes Mellitus NO   Stroke/TIA prior history YES  +2   Vascular disease (PAD, MI or Aortic Plaque)? YES  +1   Age 64 - 74 NO   Female YES  +1   Total 6     Annual Stroke Risk:  [id number] Score Stroke Risk %   0 0   1 1.3   2 2.2   3 3.2   4 4.0   5 6.7   6 9.8   7 9.6   8 6.7   9 15.2     Atrial Fibrillation: afib rate 60   - Rate control (60 bpm): controlled  - Anticoagulation none at present  - Need for cardioversion no     Assessment  1. PVD (peripheral vascular disease)  Unchanged.  Both feet are warm, sensation intact    2. Mixed hyperlipidemia  unchanged    3. Essential hypertension  Well controlled on meds, continue to monitor    4. History of cerebrovascular accident  stable    5. Chronic atrial fibrillation  New onset afib, controlled HR of 60, asymptomatic  [id number] - VASc score = 6        Plan and Discussion  Discussed her new onset atrial fibrillation with  controlled ventricular response with a heart rate of 60.  She is asymptomatic atrial fibrillation.  Discussed her chads score 6. Discussed starting Eliquis 2.5 mg twice daily (wt 55kg and age 78).  Will stop aspirin.  She will continue clopidogrel.  Will check baseline labs.    Follow Up  1-2 months      Johan Forte MD, F.A.C.C, F.S.C.A.I.        35 minutes were spent in chart review, documentation and review of results, and evaluation, treatment, and counseling of patient on the same day of service.    Disclaimer: This document was created using voice recognition software ([employer]). Although it may be edited, this document may contain errors related to incorrect recognition of the spoken word. Please call the physician if clarification is needed.

## 2023-06-12 ENCOUNTER — LAB VISIT (OUTPATIENT)
Dept: PRIMARY CARE CLINIC | Facility: CLINIC | Age: 78
End: 2023-06-12
Payer: MEDICARE

## 2023-06-12 DIAGNOSIS — I48.20 CHRONIC ATRIAL FIBRILLATION: ICD-10-CM

## 2023-06-12 LAB
ALBUMIN SERPL BCP-MCNC: 4 G/DL (ref 3.5–5.2)
ALP SERPL-CCNC: 70 U/L (ref 55–135)
ALT SERPL W/O P-5'-P-CCNC: 8 U/L (ref 10–44)
ANION GAP SERPL CALC-SCNC: 11 MMOL/L (ref 8–16)
AST SERPL-CCNC: 17 U/L (ref 10–40)
BILIRUB SERPL-MCNC: 0.3 MG/DL (ref 0.1–1)
BUN SERPL-MCNC: 18 MG/DL (ref 8–23)
CALCIUM SERPL-MCNC: 10.1 MG/DL (ref 8.7–10.5)
CHLORIDE SERPL-SCNC: 103 MMOL/L (ref 95–110)
CO2 SERPL-SCNC: 28 MMOL/L (ref 23–29)
CREAT SERPL-MCNC: 0.8 MG/DL (ref 0.5–1.4)
ERYTHROCYTE [DISTWIDTH] IN BLOOD BY AUTOMATED COUNT: 14.9 % (ref 11.5–14.5)
EST. GFR  (NO RACE VARIABLE): >60 ML/MIN/1.73 M^2
GLUCOSE SERPL-MCNC: 91 MG/DL (ref 70–110)
HCT VFR BLD AUTO: 38.5 % (ref 37–48.5)
HGB BLD-MCNC: 11.9 G/DL (ref 12–16)
MCH RBC QN AUTO: 27.8 PG (ref 27–31)
MCHC RBC AUTO-ENTMCNC: 30.9 G/DL (ref 32–36)
MCV RBC AUTO: 90 FL (ref 82–98)
PLATELET # BLD AUTO: 309 K/UL (ref 150–450)
PMV BLD AUTO: 9.5 FL (ref 9.2–12.9)
POTASSIUM SERPL-SCNC: 4.1 MMOL/L (ref 3.5–5.1)
PROT SERPL-MCNC: 7.7 G/DL (ref 6–8.4)
RBC # BLD AUTO: 4.28 M/UL (ref 4–5.4)
SODIUM SERPL-SCNC: 142 MMOL/L (ref 136–145)
TSH SERPL DL<=0.005 MIU/L-ACNC: 1.15 UIU/ML (ref 0.4–4)
WBC # BLD AUTO: 7.73 K/UL (ref 3.9–12.7)

## 2023-06-12 PROCEDURE — 84443 ASSAY THYROID STIM HORMONE: CPT | Performed by: INTERNAL MEDICINE

## 2023-06-12 PROCEDURE — 85027 COMPLETE CBC AUTOMATED: CPT | Performed by: INTERNAL MEDICINE

## 2023-06-12 PROCEDURE — 80053 COMPREHEN METABOLIC PANEL: CPT | Performed by: INTERNAL MEDICINE

## 2023-06-13 ENCOUNTER — OFFICE VISIT (OUTPATIENT)
Dept: CARDIOLOGY | Facility: CLINIC | Age: 78
End: 2023-06-13
Payer: MEDICARE

## 2023-06-13 VITALS
WEIGHT: 119.06 LBS | HEIGHT: 61 IN | OXYGEN SATURATION: 99 % | BODY MASS INDEX: 22.48 KG/M2 | HEART RATE: 72 BPM | SYSTOLIC BLOOD PRESSURE: 136 MMHG | DIASTOLIC BLOOD PRESSURE: 62 MMHG

## 2023-06-13 DIAGNOSIS — E78.2 MIXED HYPERLIPIDEMIA: ICD-10-CM

## 2023-06-13 DIAGNOSIS — I48.20 CHRONIC ATRIAL FIBRILLATION: ICD-10-CM

## 2023-06-13 DIAGNOSIS — Z86.73 HISTORY OF CEREBROVASCULAR ACCIDENT: Primary | ICD-10-CM

## 2023-06-13 DIAGNOSIS — I73.9 PVD (PERIPHERAL VASCULAR DISEASE): ICD-10-CM

## 2023-06-13 DIAGNOSIS — I10 ESSENTIAL HYPERTENSION: ICD-10-CM

## 2023-06-13 PROCEDURE — 99999 PR PBB SHADOW E&M-EST. PATIENT-LVL IV: CPT | Mod: PBBFAC,,, | Performed by: INTERNAL MEDICINE

## 2023-06-13 PROCEDURE — 99214 OFFICE O/P EST MOD 30 MIN: CPT | Mod: PBBFAC,PN | Performed by: INTERNAL MEDICINE

## 2023-06-13 PROCEDURE — 99214 OFFICE O/P EST MOD 30 MIN: CPT | Mod: S$PBB,,, | Performed by: INTERNAL MEDICINE

## 2023-06-13 PROCEDURE — 99214 PR OFFICE/OUTPT VISIT, EST, LEVL IV, 30-39 MIN: ICD-10-PCS | Mod: S$PBB,,, | Performed by: INTERNAL MEDICINE

## 2023-06-13 PROCEDURE — 99999 PR PBB SHADOW E&M-EST. PATIENT-LVL IV: ICD-10-PCS | Mod: PBBFAC,,, | Performed by: INTERNAL MEDICINE

## 2023-06-13 NOTE — PROGRESS NOTES
Cardiology    6/13/2023  2:14 PM    Problem list  Patient Active Problem List   Diagnosis    Essential hypertension    History of cerebrovascular accident    Hyperlipidemia    PVD (peripheral vascular disease)    Chronic atrial fibrillation       CC:  F/u    HPI:  She is doing well.  She denies any chest pain, shortness of breath, palpitation.  She denies any bleeding.  She is tolerating the Eliquis 2.5 mg.  We discussed her lab results.    Medications  Current Outpatient Medications   Medication Sig Dispense Refill    apixaban (ELIQUIS) 2.5 mg Tab Take 1 tablet (2.5 mg total) by mouth 2 (two) times daily. 180 tablet 3    cilostazoL (PLETAL) 50 MG Tab Take 50 mg by mouth once daily.      clopidogreL (PLAVIX) 75 mg tablet 75 mg once daily.      diltiaZEM (CARDIZEM CD) 300 MG 24 hr capsule Take 300 mg by mouth once daily.      gabapentin (NEURONTIN) 100 MG capsule Take 200 mg by mouth 2 (two) times daily.      latanoprost 0.005 % ophthalmic solution Place into both eyes.      losartan (COZAAR) 100 MG tablet Take 100 mg by mouth once daily.      meloxicam (MOBIC) 15 MG tablet Take 15 mg by mouth daily as needed for Pain.      multivitamin (THERAGRAN) per tablet Take 1 tablet by mouth once daily.      timolol maleate 0.5% (TIMOPTIC) 0.5 % Drop Place into both eyes.       No current facility-administered medications for this visit.      Prior to Admission medications    Medication Sig Start Date End Date Taking? Authorizing Provider   apixaban (ELIQUIS) 2.5 mg Tab Take 1 tablet (2.5 mg total) by mouth 2 (two) times daily. 4/12/23  Yes Johan Forte MD   cilostazoL (PLETAL) 50 MG Tab Take 50 mg by mouth once daily. 7/8/22  Yes Historical Provider   clopidogreL (PLAVIX) 75 mg tablet 75 mg once daily. 9/4/20  Yes Historical Provider   diltiaZEM (CARDIZEM CD) 300 MG 24 hr capsule Take 300 mg by mouth once daily. 6/30/22  Yes Historical Provider   gabapentin (NEURONTIN) 100 MG capsule Take 200 mg by mouth 2 (two)  times daily. 4/5/23  Yes Historical Provider   latanoprost 0.005 % ophthalmic solution Place into both eyes. 12/16/21  Yes Historical Provider   losartan (COZAAR) 100 MG tablet Take 100 mg by mouth once daily.   Yes Historical Provider   meloxicam (MOBIC) 15 MG tablet Take 15 mg by mouth daily as needed for Pain.   Yes Historical Provider   multivitamin (THERAGRAN) per tablet Take 1 tablet by mouth once daily.   Yes Historical Provider   timolol maleate 0.5% (TIMOPTIC) 0.5 % Drop Place into both eyes. 12/16/21  Yes Historical Provider   aspirin (ECOTRIN) 81 MG EC tablet Take 81 mg by mouth once daily.  6/13/23  Historical Provider         History  No past medical history on file.  No past surgical history on file.  Social History     Socioeconomic History    Marital status:    Tobacco Use    Smoking status: Former     Types: Cigarettes    Smokeless tobacco: Never   Substance and Sexual Activity    Alcohol use: Never         Allergies  Review of patient's allergies indicates:   Allergen Reactions    Penicillin          Review of Systems   Review of Systems   Constitutional: Negative for decreased appetite, fever and weight loss.   HENT:  Negative for congestion and nosebleeds.    Eyes:  Negative for double vision, vision loss in left eye, vision loss in right eye and visual disturbance.   Cardiovascular:  Negative for chest pain, claudication, cyanosis, dyspnea on exertion, irregular heartbeat, leg swelling, near-syncope, orthopnea, palpitations, paroxysmal nocturnal dyspnea and syncope.   Respiratory:  Negative for cough, hemoptysis, shortness of breath, sleep disturbances due to breathing, snoring, sputum production and wheezing.    Endocrine: Negative for cold intolerance and heat intolerance.   Skin:  Negative for nail changes and rash.   Musculoskeletal:  Negative for joint pain, muscle cramps, muscle weakness and myalgias.   Gastrointestinal:  Negative for change in bowel habit, heartburn, hematemesis,  hematochezia, hemorrhoids and melena.   Neurological:  Negative for dizziness, focal weakness and headaches.       Physical Exam  Wt Readings from Last 1 Encounters:   06/13/23 54 kg (119 lb 0.8 oz)     BP Readings from Last 3 Encounters:   06/13/23 136/62   04/12/23 (!) 126/58   07/12/22 128/64     Pulse Readings from Last 1 Encounters:   06/13/23 72     Body mass index is 22.49 kg/m².    Physical Exam  Vitals reviewed.   Constitutional:       Appearance: She is well-developed.   Neck:      Vascular: No carotid bruit or JVD.   Cardiovascular:      Rate and Rhythm: Normal rate. Rhythm irregularly irregular.      Pulses:           Carotid pulses are 2+ on the right side and 2+ on the left side.       Radial pulses are 2+ on the right side and 2+ on the left side.        Dorsalis pedis pulses are 0 on the right side and 0 on the left side.      Heart sounds: S1 normal and S2 normal.   Pulmonary:      Breath sounds: Normal breath sounds.   Neurological:      Mental Status: She is alert and oriented to person, place, and time.           Assessment  1. History of cerebrovascular accident  stable    2. Chronic atrial fibrillation  Stable, on Eliquis    3. PVD (peripheral vascular disease)  Stable claudication    4. Mixed hyperlipidemia  unchanged    5. Essential hypertensioin  controlled      Plan and Discussion  Discussed her lab results.  Recommend to continue current medications.    Follow Up  6 months      Johan Forte MD, F.A.C.C, F.S.C.A.I.        30 minutes were spent in chart review, documentation and review of results, and evaluation, treatment, and counseling of patient on the same day of service.    Disclaimer: This document was created using voice recognition software ([employer] Direct). Although it may be edited, this document may contain errors related to incorrect recognition of the spoken word. Please call the physician if clarification is needed.

## 2023-12-13 ENCOUNTER — OFFICE VISIT (OUTPATIENT)
Dept: CARDIOLOGY | Facility: CLINIC | Age: 78
End: 2023-12-13
Payer: MEDICARE

## 2023-12-13 VITALS
DIASTOLIC BLOOD PRESSURE: 64 MMHG | SYSTOLIC BLOOD PRESSURE: 118 MMHG | WEIGHT: 121.06 LBS | HEART RATE: 70 BPM | OXYGEN SATURATION: 97 % | BODY MASS INDEX: 22.87 KG/M2

## 2023-12-13 DIAGNOSIS — I48.20 CHRONIC ATRIAL FIBRILLATION: ICD-10-CM

## 2023-12-13 DIAGNOSIS — Z86.73 HISTORY OF CEREBROVASCULAR ACCIDENT: Primary | ICD-10-CM

## 2023-12-13 DIAGNOSIS — I73.9 PVD (PERIPHERAL VASCULAR DISEASE): ICD-10-CM

## 2023-12-13 DIAGNOSIS — E78.2 MIXED HYPERLIPIDEMIA: ICD-10-CM

## 2023-12-13 DIAGNOSIS — I10 ESSENTIAL HYPERTENSION: ICD-10-CM

## 2023-12-13 PROCEDURE — 93005 ELECTROCARDIOGRAM TRACING: CPT | Mod: PBBFAC,PN | Performed by: INTERNAL MEDICINE

## 2023-12-13 PROCEDURE — 99213 OFFICE O/P EST LOW 20 MIN: CPT | Mod: PBBFAC,PN | Performed by: INTERNAL MEDICINE

## 2023-12-13 PROCEDURE — 99999 PR PBB SHADOW E&M-EST. PATIENT-LVL III: ICD-10-PCS | Mod: PBBFAC,,, | Performed by: INTERNAL MEDICINE

## 2023-12-13 PROCEDURE — 99214 OFFICE O/P EST MOD 30 MIN: CPT | Mod: S$PBB,,, | Performed by: INTERNAL MEDICINE

## 2023-12-13 PROCEDURE — 93010 ELECTROCARDIOGRAM REPORT: CPT | Mod: S$PBB,,, | Performed by: INTERNAL MEDICINE

## 2023-12-13 PROCEDURE — 93010 EKG 12-LEAD: ICD-10-PCS | Mod: S$PBB,,, | Performed by: INTERNAL MEDICINE

## 2023-12-13 PROCEDURE — 99214 PR OFFICE/OUTPT VISIT, EST, LEVL IV, 30-39 MIN: ICD-10-PCS | Mod: S$PBB,,, | Performed by: INTERNAL MEDICINE

## 2023-12-13 PROCEDURE — 99999 PR PBB SHADOW E&M-EST. PATIENT-LVL III: CPT | Mod: PBBFAC,,, | Performed by: INTERNAL MEDICINE

## 2023-12-13 RX ORDER — CITALOPRAM 20 MG/1
20 TABLET, FILM COATED ORAL DAILY
COMMUNITY

## 2023-12-13 RX ORDER — ATORVASTATIN CALCIUM 40 MG/1
40 TABLET, FILM COATED ORAL DAILY
COMMUNITY

## 2023-12-13 NOTE — PROGRESS NOTES
Cardiology    12/13/2023  11:46 AM    Problem list  Patient Active Problem List   Diagnosis    Essential hypertension    History of cerebrovascular accident    Hyperlipidemia    PVD (peripheral vascular disease)    Chronic atrial fibrillation       CC:  F/u    HPI:  She is here for follow-up.  She denies any cardiac complaints.  She denies any chest pain, shortness of breath, palpitation or syncope.  She denies any worsening claudication.  She has glaucoma and right eye is currently seeing ophthalmologist and may need surgery.    Medications  Current Outpatient Medications   Medication Sig Dispense Refill    apixaban (ELIQUIS) 2.5 mg Tab Take 1 tablet (2.5 mg total) by mouth 2 (two) times daily. 180 tablet 3    atorvastatin (LIPITOR) 40 MG tablet Take 40 mg by mouth once daily.      citalopram (CELEXA) 20 MG tablet Take 20 mg by mouth once daily.      diltiaZEM (CARDIZEM CD) 300 MG 24 hr capsule Take 300 mg by mouth once daily.      losartan (COZAAR) 100 MG tablet Take 100 mg by mouth once daily.       No current facility-administered medications for this visit.      Prior to Admission medications    Medication Sig Start Date End Date Taking? Authorizing Provider   apixaban (ELIQUIS) 2.5 mg Tab Take 1 tablet (2.5 mg total) by mouth 2 (two) times daily. 4/12/23  Yes Johan Forte MD   atorvastatin (LIPITOR) 40 MG tablet Take 40 mg by mouth once daily.   Yes Provider, Historical   citalopram (CELEXA) 20 MG tablet Take 20 mg by mouth once daily.   Yes Provider, Historical   diltiaZEM (CARDIZEM CD) 300 MG 24 hr capsule Take 300 mg by mouth once daily. 6/30/22  Yes Provider, Historical   losartan (COZAAR) 100 MG tablet Take 100 mg by mouth once daily.   Yes Provider, Historical   cilostazoL (PLETAL) 50 MG Tab Take 50 mg by mouth once daily. 7/8/22 12/13/23  Provider, Historical   clopidogreL (PLAVIX) 75 mg tablet 75 mg once daily. 9/4/20 12/13/23  Provider, Historical   gabapentin (NEURONTIN) 100 MG capsule  Take 200 mg by mouth 2 (two) times daily. 4/5/23 12/13/23  Provider, Historical   latanoprost 0.005 % ophthalmic solution Place into both eyes. 12/16/21 12/13/23  Provider, Historical   meloxicam (MOBIC) 15 MG tablet Take 15 mg by mouth daily as needed for Pain.  12/13/23  Provider, Historical   multivitamin (THERAGRAN) per tablet Take 1 tablet by mouth once daily.  12/13/23  Provider, Historical   timolol maleate 0.5% (TIMOPTIC) 0.5 % Drop Place into both eyes. 12/16/21 12/13/23  Provider, Historical         History  No past medical history on file.  No past surgical history on file.  Social History     Socioeconomic History    Marital status:    Tobacco Use    Smoking status: Former     Types: Cigarettes    Smokeless tobacco: Never   Substance and Sexual Activity    Alcohol use: Never         Allergies  Review of patient's allergies indicates:   Allergen Reactions    Penicillin          Review of Systems   Review of Systems   Constitutional: Negative for decreased appetite, fever and weight loss.   HENT:  Negative for congestion and nosebleeds.    Eyes:  Negative for double vision, vision loss in left eye, vision loss in right eye and visual disturbance.   Cardiovascular:  Negative for chest pain, claudication, cyanosis, dyspnea on exertion, irregular heartbeat, leg swelling, near-syncope, orthopnea, palpitations, paroxysmal nocturnal dyspnea and syncope.   Respiratory:  Negative for cough, hemoptysis, shortness of breath, sleep disturbances due to breathing, snoring, sputum production and wheezing.    Endocrine: Negative for cold intolerance and heat intolerance.   Skin:  Negative for nail changes and rash.   Musculoskeletal:  Negative for joint pain, muscle cramps, muscle weakness and myalgias.   Gastrointestinal:  Negative for change in bowel habit, heartburn, hematemesis, hematochezia, hemorrhoids and melena.   Neurological:  Negative for dizziness, focal weakness and headaches.         Physical  Exam  Wt Readings from Last 1 Encounters:   12/13/23 54.9 kg (121 lb 0.5 oz)     BP Readings from Last 3 Encounters:   12/13/23 118/64   06/13/23 136/62   04/12/23 (!) 126/58     Pulse Readings from Last 1 Encounters:   12/13/23 70     Body mass index is 22.87 kg/m².    Physical Exam  Vitals reviewed.   Constitutional:       Appearance: She is well-developed.   Neck:      Vascular: No carotid bruit or JVD.   Cardiovascular:      Rate and Rhythm: Normal rate. Rhythm irregularly irregular.      Pulses:           Carotid pulses are 2+ on the right side and 2+ on the left side.       Radial pulses are 2+ on the right side and 2+ on the left side.        Dorsalis pedis pulses are 0 on the right side and 0 on the left side.      Heart sounds: S1 normal and S2 normal.   Pulmonary:      Breath sounds: Normal breath sounds.   Neurological:      Mental Status: She is alert and oriented to person, place, and time.             Assessment  1. History of cerebrovascular accident  Stable    2. Essential hypertension  Well controlled on current medications    3. PVD (peripheral vascular disease)  Stable claudication    4. Mixed hyperlipidemia  Stable    5. Chronic atrial fibrillation  Atrial fibrillation with controlled ventricular response with heart rate of 71, on Eliquis  - EKG 12-lead        Plan and Discussion  Discussed her EKG today which showed atrial fibrillation rate of 71.  Recommend to continue the current medications.    Follow Up  6 months      Johan Forte MD, F.A.C.C, F.S.C.A.I.        30 minutes were spent in chart review, documentation and review of results, and evaluation, treatment, and counseling of patient on the same day of service.    Disclaimer: This document was created using voice recognition software ([employer]). Although it may be edited, this document may contain errors related to incorrect recognition of the spoken word. Please call the physician if clarification is needed.

## 2024-04-11 ENCOUNTER — OFFICE VISIT (OUTPATIENT)
Dept: CARDIOLOGY | Facility: CLINIC | Age: 79
End: 2024-04-11
Payer: MEDICARE

## 2024-04-11 VITALS
DIASTOLIC BLOOD PRESSURE: 72 MMHG | HEART RATE: 74 BPM | OXYGEN SATURATION: 97 % | BODY MASS INDEX: 21.89 KG/M2 | WEIGHT: 115.88 LBS | SYSTOLIC BLOOD PRESSURE: 142 MMHG

## 2024-04-11 DIAGNOSIS — I73.9 PVD (PERIPHERAL VASCULAR DISEASE): ICD-10-CM

## 2024-04-11 DIAGNOSIS — I10 ESSENTIAL HYPERTENSION: Primary | ICD-10-CM

## 2024-04-11 DIAGNOSIS — I48.20 CHRONIC ATRIAL FIBRILLATION: ICD-10-CM

## 2024-04-11 DIAGNOSIS — E78.2 MIXED HYPERLIPIDEMIA: ICD-10-CM

## 2024-04-11 PROCEDURE — 99999 PR PBB SHADOW E&M-EST. PATIENT-LVL III: CPT | Mod: PBBFAC,,, | Performed by: INTERNAL MEDICINE

## 2024-04-11 PROCEDURE — 99214 OFFICE O/P EST MOD 30 MIN: CPT | Mod: S$PBB,,, | Performed by: INTERNAL MEDICINE

## 2024-04-11 PROCEDURE — 99213 OFFICE O/P EST LOW 20 MIN: CPT | Mod: PBBFAC | Performed by: INTERNAL MEDICINE

## 2024-04-11 NOTE — PROGRESS NOTES
Cardiology    4/11/2024  2:52 PM    Problem list  Patient Active Problem List   Diagnosis    Essential hypertension    History of cerebrovascular accident    Hyperlipidemia    PVD (peripheral vascular disease)    Chronic atrial fibrillation       CC:  Cardiac clearance for eye surgery.    HPI:  Patient made this appointment for cardiac clearance for glaucoma surgery in her right eye.  She denies any cardiac complaints.  She denies any chest pain, shortness breath, palpitation or syncope.  She denies any bleeding.    Medications  Current Outpatient Medications   Medication Sig Dispense Refill    apixaban (ELIQUIS) 2.5 mg Tab Take 1 tablet (2.5 mg total) by mouth 2 (two) times daily. 180 tablet 3    atorvastatin (LIPITOR) 40 MG tablet Take 40 mg by mouth once daily.      citalopram (CELEXA) 20 MG tablet Take 20 mg by mouth once daily.      diltiaZEM (CARDIZEM CD) 300 MG 24 hr capsule Take 300 mg by mouth once daily.      losartan (COZAAR) 100 MG tablet Take 100 mg by mouth once daily.       No current facility-administered medications for this visit.      Prior to Admission medications    Medication Sig Start Date End Date Taking? Authorizing Provider   apixaban (ELIQUIS) 2.5 mg Tab Take 1 tablet (2.5 mg total) by mouth 2 (two) times daily. 4/12/23  Yes Johan Forte MD   atorvastatin (LIPITOR) 40 MG tablet Take 40 mg by mouth once daily.   Yes Provider, Historical   citalopram (CELEXA) 20 MG tablet Take 20 mg by mouth once daily.   Yes Provider, Historical   diltiaZEM (CARDIZEM CD) 300 MG 24 hr capsule Take 300 mg by mouth once daily. 6/30/22  Yes Provider, Historical   losartan (COZAAR) 100 MG tablet Take 100 mg by mouth once daily.   Yes Provider, Historical         History  No past medical history on file.  No past surgical history on file.  Social History     Socioeconomic History    Marital status:    Tobacco Use    Smoking status: Former     Types: Cigarettes    Smokeless tobacco: Never    Substance and Sexual Activity    Alcohol use: Never         Allergies  Review of patient's allergies indicates:   Allergen Reactions    Penicillin          Review of Systems   Review of Systems   Constitutional: Negative for decreased appetite, fever and weight loss.   HENT:  Negative for congestion and nosebleeds.    Eyes:  Negative for double vision, vision loss in left eye, vision loss in right eye and visual disturbance.   Cardiovascular:  Negative for chest pain, claudication, cyanosis, dyspnea on exertion, irregular heartbeat, leg swelling, near-syncope, orthopnea, palpitations, paroxysmal nocturnal dyspnea and syncope.   Respiratory:  Negative for cough, hemoptysis, shortness of breath, sleep disturbances due to breathing, snoring, sputum production and wheezing.    Endocrine: Negative for cold intolerance and heat intolerance.   Skin:  Negative for nail changes and rash.   Musculoskeletal:  Negative for joint pain, muscle cramps, muscle weakness and myalgias.   Gastrointestinal:  Negative for change in bowel habit, heartburn, hematemesis, hematochezia, hemorrhoids and melena.   Neurological:  Negative for dizziness, focal weakness and headaches.         Physical Exam  Wt Readings from Last 1 Encounters:   04/11/24 52.6 kg (115 lb 13.6 oz)     BP Readings from Last 3 Encounters:   04/11/24 (!) 142/72   12/13/23 118/64   06/13/23 136/62     Pulse Readings from Last 1 Encounters:   04/11/24 74     Body mass index is 21.89 kg/m².    Physical Exam  Vitals reviewed.   Constitutional:       Appearance: She is well-developed.   Neck:      Vascular: No carotid bruit or JVD.   Cardiovascular:      Rate and Rhythm: Normal rate. Rhythm irregularly irregular.      Pulses:           Carotid pulses are 2+ on the right side and 2+ on the left side.       Radial pulses are 2+ on the right side and 2+ on the left side.        Dorsalis pedis pulses are 0 on the right side and 0 on the left side.      Heart sounds: S1 normal  and S2 normal.   Pulmonary:      Breath sounds: Normal breath sounds.   Neurological:      Mental Status: She is alert and oriented to person, place, and time.             Assessment  1. Essential hypertension  Controlled, on current medications    2. PVD (peripheral vascular disease)  Stable    3. Mixed hyperlipidemia  Stable    4. Chronic AF, rate controlled, on Eliquis      Plan and Discussion  Patient is stable from a cardiac standpoint.  Her she has chronic atrial fibrillation which is rate controlled.  She is on Eliquis for stroke prophylaxis.  She may proceed with glaucoma surgery without additional cardiac testing.  Ok to hold Eliquis for surgery.    Follow Up  As scheduled in May      Johan Forte MD, F.A.C.C, F.S.C.A.I.      Total professional time spent for the encounter: 30 minutes  Time was spent preparing to see the patient, reviewing results of prior testing, obtaining and/or reviewing separately obtained history, performing a medically appropriate examination and interview, counseling and educating the patient/family, ordering medications/tests/procedures, referring and communicating with other health care professionals, documenting clinical information in the electronic health record, and independently interpreting results.    Disclaimer: This document was created using voice recognition software ([employer] Direct). Although it may be edited, this document may contain errors related to incorrect recognition of the spoken word. Please call the physician if clarification is needed. 3

## 2024-05-01 DIAGNOSIS — I48.20 CHRONIC ATRIAL FIBRILLATION: ICD-10-CM

## 2024-05-01 NOTE — TELEPHONE ENCOUNTER
----- Message from Yaya Paeze sent at 5/1/2024  9:28 AM CDT -----  Regarding: Rx  Name of Who is Calling:  Patient          What is the request in detail:  Patient would like to know if Dr. Forte call in a Rx for her heart, Patient does not know the name of her Rx and stated she can't see the name of the spelling. Patient stated she is out of the Rx. Please call patient so she can give the Pharmacy she would like the Rx sent to            Can the clinic reply by [facility]: No            What Number to Call Back [facility][phone]

## 2024-07-25 ENCOUNTER — TELEPHONE (OUTPATIENT)
Dept: CARDIOLOGY | Facility: CLINIC | Age: 79
End: 2024-07-25
Payer: COMMERCIAL

## 2024-07-25 DIAGNOSIS — M79.606 PAIN OF LOWER EXTREMITY, UNSPECIFIED LATERALITY: Primary | ICD-10-CM

## 2024-07-30 ENCOUNTER — TELEPHONE (OUTPATIENT)
Dept: CARDIOLOGY | Facility: CLINIC | Age: 79
End: 2024-07-30
Payer: COMMERCIAL

## 2024-07-30 NOTE — TELEPHONE ENCOUNTER
3rd message left for pt to contact the office to schedule LE arterial doppler and follow up appointment with Dr. Forte. See attached referral scanned in. Left office phone number.

## 2025-06-20 ENCOUNTER — OFFICE VISIT (OUTPATIENT)
Dept: CARDIOLOGY | Facility: CLINIC | Age: 80
End: 2025-06-20
Payer: MEDICARE

## 2025-06-20 VITALS
SYSTOLIC BLOOD PRESSURE: 122 MMHG | DIASTOLIC BLOOD PRESSURE: 60 MMHG | WEIGHT: 112.5 LBS | BODY MASS INDEX: 21.26 KG/M2 | HEART RATE: 67 BPM | OXYGEN SATURATION: 97 %

## 2025-06-20 DIAGNOSIS — I48.20 CHRONIC ATRIAL FIBRILLATION: ICD-10-CM

## 2025-06-20 DIAGNOSIS — I73.9 PVD (PERIPHERAL VASCULAR DISEASE): Primary | ICD-10-CM

## 2025-06-20 DIAGNOSIS — E78.2 MIXED HYPERLIPIDEMIA: ICD-10-CM

## 2025-06-20 DIAGNOSIS — I10 ESSENTIAL HYPERTENSION: ICD-10-CM

## 2025-06-20 LAB
OHS QRS DURATION: 78 MS
OHS QTC CALCULATION: 402 MS

## 2025-06-20 PROCEDURE — 99999 PR PBB SHADOW E&M-EST. PATIENT-LVL III: CPT | Mod: PBBFAC,,, | Performed by: INTERNAL MEDICINE

## 2025-06-20 PROCEDURE — 99213 OFFICE O/P EST LOW 20 MIN: CPT | Mod: PBBFAC | Performed by: INTERNAL MEDICINE

## 2025-06-20 RX ORDER — CILOSTAZOL 100 MG/1
50 TABLET ORAL 2 TIMES DAILY
COMMUNITY

## 2025-06-20 RX ORDER — TIZANIDINE 4 MG/1
4 TABLET ORAL 3 TIMES DAILY
COMMUNITY

## 2025-06-20 NOTE — PROGRESS NOTES
Cardiology    6/20/2025  8:58 AM    Problem list  Problem List[1]    History of Present Illness    Ms. Olson presents today for follow up She has a history of PVD with occluded femoral-femoral bypass and followed by Dr. Terrell at [facility]. She experiences occasional chest pain but denies dyspnea with walking or gasping for air during physical activity. She reports tightness and ache involving bilateral hips, thighs, neck, and shoulders. She previously quit smoking for 2 years but recently relapsed and smoked for 2 weeks during a trip to [place]. She is currently not actively smoking. She is taking Eliquis.   She denies any bleeding.          Medications  Current Medications[2]   Prior to Admission medications    Medication Sig Start Date End Date Taking? Authorizing Provider   atorvastatin (LIPITOR) 40 MG tablet Take 40 mg by mouth once daily.   Yes Provider, Historical   cilostazoL (PLETAL) 100 MG Tab Take 50 mg by mouth 2 (two) times daily.   Yes Provider, Historical   citalopram (CELEXA) 20 MG tablet Take 20 mg by mouth once daily.   Yes Provider, Historical   diltiaZEM (CARDIZEM CD) 300 MG 24 hr capsule Take 300 mg by mouth once daily. 6/30/22  Yes Provider, Historical   losartan (COZAAR) 100 MG tablet Take 100 mg by mouth once daily.   Yes Provider, Historical   tiZANidine (ZANAFLEX) 4 MG tablet Take 4 mg by mouth 3 (three) times daily.   Yes Provider, Historical   apixaban (ELIQUIS) 2.5 mg Tab Take 1 tablet (2.5 mg total) by mouth 2 (two) times daily.  Patient not taking: Reported on 6/20/2025 5/1/24   Johan Forte MD         History  No past medical history on file.  No past surgical history on file.  Social History[3]      Allergies  Review of patient's allergies indicates:   Allergen Reactions    Penicillin          Review of Systems   Review of Systems   Constitutional: Negative for decreased appetite, fever and weight loss.   HENT:  Negative for congestion and nosebleeds.    Eyes:  Negative  for double vision, vision loss in left eye, vision loss in right eye and visual disturbance.   Cardiovascular:  Negative for chest pain, claudication, cyanosis, dyspnea on exertion, irregular heartbeat, leg swelling, near-syncope, orthopnea, palpitations, paroxysmal nocturnal dyspnea and syncope.   Respiratory:  Negative for cough, hemoptysis, shortness of breath, sleep disturbances due to breathing, snoring, sputum production and wheezing.    Endocrine: Negative for cold intolerance and heat intolerance.   Skin:  Negative for nail changes and rash.   Musculoskeletal:  Negative for joint pain, muscle cramps, muscle weakness and myalgias.   Gastrointestinal:  Negative for change in bowel habit, heartburn, hematemesis, hematochezia, hemorrhoids and melena.   Neurological:  Negative for dizziness, focal weakness and headaches.         Physical Exam  Wt Readings from Last 1 Encounters:   06/20/25 51 kg (112 lb 8 oz)     BP Readings from Last 3 Encounters:   06/20/25 122/60   04/11/24 (!) 142/72   12/13/23 118/64     Pulse Readings from Last 1 Encounters:   06/20/25 67     Body mass index is 21.26 kg/m².    Physical Exam  Vitals reviewed.   Constitutional:       Appearance: She is well-developed.   Neck:      Vascular: No carotid bruit or JVD.   Cardiovascular:      Rate and Rhythm: Normal rate and regular rhythm.      Pulses:           Carotid pulses are 2+ on the right side and 2+ on the left side.       Radial pulses are 2+ on the right side and 2+ on the left side.        Dorsalis pedis pulses are 0 on the right side and 0 on the left side.      Heart sounds: S1 normal and S2 normal.   Pulmonary:      Breath sounds: Normal breath sounds.   Neurological:      Mental Status: She is alert and oriented to person, place, and time.             Assessment  1. Chronic atrial fibrillation  In sinus rhythm, on Eliquis 2.5 mg twice daily  - EKG 12-lead  - Echo; Future    2. PVD (peripheral vascular disease) (Primary)  Stable,  followed at [facility] by Dr Terrell    3. Mixed hyperlipidemia  On statin    4. Essential hypertension  Well controlled, continue medications and monitor        Plan and Discussion  Discussed her EKG today which showed normal sinus rhythm rate of 69 with nonspecific ST-T changes.  Her blood pressure is well controlled.  Recommend to continue with current medication.  Recommend to continue with Eliquis 2.5 mg twice daily given her age and her weight.  Last echo was > 3 years ago.    Follow Up  6 months with echo.        Johan Forte MD, F.A.C.C, F.S.C.A.I.      Total professional time spent for the encounter: 25 minutes  Time was spent preparing to see the patient, reviewing results of prior testing, obtaining and/or reviewing separately obtained history, performing a medically appropriate examination and interview, counseling and educating the patient/family, ordering medications/tests/procedures, referring and communicating with other health care professionals, documenting clinical information in the electronic health record, and independently interpreting results.    This note was generated with the assistance of ambient listening technology. Verbal consent was obtained by the patient and accompanying visitor(s) for the recording of patient appointment to facilitate this note. I attest to having reviewed and edited the generated note for accuracy, though some syntax or spelling errors may persist. Please contact the author of this note for any clarification.           [1]   Patient Active Problem List  Diagnosis    Essential hypertension    History of cerebrovascular accident    Hyperlipidemia    PVD (peripheral vascular disease)    Chronic atrial fibrillation   [2]   Current Outpatient Medications   Medication Sig Dispense Refill    atorvastatin (LIPITOR) 40 MG tablet Take 40 mg by mouth once daily.      cilostazoL (PLETAL) 100 MG Tab Take 50 mg by mouth 2 (two) times daily.      citalopram (CELEXA) 20 MG tablet  Take 20 mg by mouth once daily.      diltiaZEM (CARDIZEM CD) 300 MG 24 hr capsule Take 300 mg by mouth once daily.      losartan (COZAAR) 100 MG tablet Take 100 mg by mouth once daily.      tiZANidine (ZANAFLEX) 4 MG tablet Take 4 mg by mouth 3 (three) times daily.      apixaban (ELIQUIS) 2.5 mg Tab Take 1 tablet (2.5 mg total) by mouth 2 (two) times daily. (Patient not taking: Reported on 6/20/2025) 180 tablet 3     No current facility-administered medications for this visit.   [3]   Social History  Socioeconomic History    Marital status:    Tobacco Use    Smoking status: Former     Types: Cigarettes    Smokeless tobacco: Never   Substance and Sexual Activity    Alcohol use: Never     Social Drivers of Health     Financial Resource Strain: Low Risk  (6/20/2025)    Overall Financial Resource Strain (CARDIA)     Difficulty of Paying Living Expenses: Not hard at all   Food Insecurity: No Food Insecurity (6/20/2025)    Hunger Vital Sign     Worried About Running Out of Food in the Last Year: Never true     Ran Out of Food in the Last Year: Never true   Transportation Needs: No Transportation Needs (6/20/2025)    PRAPARE - Transportation     Lack of Transportation (Medical): No     Lack of Transportation (Non-Medical): No   Physical Activity: Unknown (6/20/2025)    Exercise Vital Sign     Days of Exercise per Week: 0 days   Stress: No Stress Concern Present (6/20/2025)    [employer] - Occupational Stress Questionnaire     Feeling of Stress : Not at all   Housing Stability: Low Risk  (6/20/2025)    Housing Stability Vital Sign     Unable to Pay for Housing in the Last Year: No     Number of Times Moved in the Last Year: 0     Homeless in the Last Year: No

## 2025-07-09 ENCOUNTER — OFFICE VISIT (OUTPATIENT)
Dept: PODIATRY | Facility: CLINIC | Age: 80
End: 2025-07-09
Payer: MEDICARE

## 2025-07-09 VITALS — BODY MASS INDEX: 21.12 KG/M2 | WEIGHT: 111.88 LBS | HEIGHT: 61 IN

## 2025-07-09 DIAGNOSIS — M20.5X2 ACQUIRED ADDUCTOVARUS ROTATION OF TOE OF LEFT FOOT: ICD-10-CM

## 2025-07-09 DIAGNOSIS — I82.402 DEEP VEIN THROMBOSIS (DVT) OF LEFT LOWER EXTREMITY, UNSPECIFIED CHRONICITY, UNSPECIFIED VEIN: ICD-10-CM

## 2025-07-09 DIAGNOSIS — M79.675 PAIN IN LEFT TOE(S): ICD-10-CM

## 2025-07-09 DIAGNOSIS — M79.672 LEFT FOOT PAIN: Primary | ICD-10-CM

## 2025-07-09 DIAGNOSIS — L85.1 ACQUIRED PLANTAR POROKERATOSIS: ICD-10-CM

## 2025-07-09 DIAGNOSIS — Z79.01 LONG TERM CURRENT USE OF ANTICOAGULANT: ICD-10-CM

## 2025-07-09 DIAGNOSIS — L84 CORN OR CALLUS: ICD-10-CM

## 2025-07-09 DIAGNOSIS — I73.9 PVD (PERIPHERAL VASCULAR DISEASE): ICD-10-CM

## 2025-07-09 PROCEDURE — 99999 PR PBB SHADOW E&M-EST. PATIENT-LVL III: CPT | Mod: PBBFAC,,, | Performed by: PODIATRIST

## 2025-07-09 PROCEDURE — 17110 DESTRUCTION B9 LES UP TO 14: CPT | Mod: PBBFAC,PN | Performed by: PODIATRIST

## 2025-07-09 PROCEDURE — 99213 OFFICE O/P EST LOW 20 MIN: CPT | Mod: PBBFAC,PN | Performed by: PODIATRIST

## 2025-07-09 NOTE — PROGRESS NOTES
Subjective:      Patient ID: Lisa Olson is a 80 y.o. female.    Chief Complaint: Callouses (Left foot) and Toe Pain (Left 5th toe)    Lisa is a 80 y.o. female who presents new to the podiatry clinic, w/ c/o L toe pain as well as corn bottom of  foot w/ or w/out shoes. Pain level up to 8/10.  States referred from her baby brother, not PCP.    PCP Romain Vragas MD 4/22/25    Uses a cane d/t L DVT (2 months ago) per referral to cardiology  .  No past medical history on file.  Patient Active Problem List    Diagnosis Date Noted    Chronic atrial fibrillation 04/12/2023    Essential hypertension 05/01/2018    History of cerebrovascular accident 05/01/2018    Hyperlipidemia 05/01/2018    PVD (peripheral vascular disease) 05/01/2018      Objective:      Review of Systems   Constitutional: Negative for malaise/fatigue.   Cardiovascular:  Positive for claudication. Negative for leg swelling.   Skin:  Negative for color change, dry skin and suspicious lesions.   Musculoskeletal:  Positive for falls. Negative for joint pain and myalgias.   Neurological:  Positive for focal weakness. Negative for sensory change.   Psychiatric/Behavioral:  The patient is not nervous/anxious.      Physical Exam  Vitals reviewed.   Constitutional:       General: She is not in acute distress.     Appearance: She is well-developed and normal weight.   Cardiovascular:      Pulses:           Dorsalis pedis pulses are 1+ on the right side and 1+ on the left side.   Musculoskeletal:         General: Tenderness present. No swelling or signs of injury.      Right foot: Deformity (adductovarus B/L L>R 3rd-5th toes) and bunion present.      Left foot: Deformity and bunion present.        Feet:    Feet:      Right foot:      Skin integrity: Skin integrity normal. No callus.      Left foot:      Skin integrity: Callus present.   Skin:     General: Skin is warm and dry.      Capillary Refill: Capillary refill takes less than 2 seconds.      Findings:  Lesion present. No bruising or erythema.      Comments: Porokeratotic lesion plantar 1st met head L W/ central nucleation/ lateralization of skin lines   Neurological:      Mental Status: She is alert and oriented to person, place, and time.      Sensory: Sensation is intact. No sensory deficit.      Motor: Weakness present. No abnormal muscle tone.      Gait: Gait abnormal (cane d/t L DVT).   Psychiatric:         Mood and Affect: Mood and affect normal.         Behavior: Behavior is cooperative.         Assessment:      Encounter Diagnoses   Name Primary?    Long term current use of anticoagulant     Deep vein thrombosis (DVT) of left lower extremity, unspecified chronicity, unspecified vein     Left foot pain Yes    Corn or callus     Pain in left toe(s)     PVD (peripheral vascular disease)     Acquired adductovarus rotation of toe of left foot     Acquired plantar porokeratosis        Problem List Items Addressed This Visit          Cardiac/Vascular    PVD (peripheral vascular disease)    Overview   Cross femoral bypass 6mm PTFE 2014.  Jan 2020:  Angiogram confirmed doppler finding of occluded cross-femoral bypass.  Referred to Dr. Altamirano (3/4/20) who recommended cilostazol.  Jan 2025:  angio at [facility].  May 2025:  managed by Dr. Terrell ([facility] cardiologist)          Other Visit Diagnoses         Left foot pain    -  Primary    Relevant Orders    Destruction of Benign Lesion/ porokeratosis v wart L 1st met.head      Long term current use of anticoagulant          Deep vein thrombosis (DVT) of left lower extremity, unspecified chronicity, unspecified vein          Corn or callus        Relevant Orders    Destruction of Benign Lesion/ porokeratosis v wart L 1st met.head      Pain in left toe(s)        Relevant Orders    CREST PADS FOR HOME USE      Acquired adductovarus rotation of toe of left foot        Relevant Orders    CREST PADS FOR HOME USE      Acquired plantar porokeratosis        Relevant Orders     Destruction of Benign Lesion/ porokeratosis v wart L 1st met.head          Plan:       Lisa was seen today for callouses and toe pain.    Diagnoses and all orders for this visit:    Left foot pain  -     Destruction of Benign Lesion/ porokeratosis v wart L 1st met.head    Long term current use of anticoagulant    Deep vein thrombosis (DVT) of left lower extremity, unspecified chronicity, unspecified vein    Corn or callus  -     Destruction of Benign Lesion/ porokeratosis v wart L 1st met.head    Pain in left toe(s)  -     CREST PADS FOR HOME USE    PVD (peripheral vascular disease)    Acquired adductovarus rotation of toe of left foot  -     CREST PADS FOR HOME USE    Acquired plantar porokeratosis  -     Destruction of Benign Lesion/ porokeratosis v wart L 1st met.head    I counseled the patient on her conditions, their implications & medical mgmt.    -Discussed shoe choice.   The importance of wearing shoes w/ adequate room in toe box to accommodate deformities were discussed.   Recommended shoes w/ adequate arch supports to alleviate abnormal pressure & improve stability of foot while walking.   Avoid flat shoes or barefoot walking as these will exacerbate or worsen symptoms.   -Dispensed crest pads for lesser toes L.    Sharp debridement & enucleation of hyperkeratotic lesion w/ sterile disposable blade. Chemical cautery applied including Trichloracetic acid & Salinocaine under occlusion. Patient to keep area CDI 48 hrs.to tolerance. Return for serial tx prn.         A total of 31 mins.was spent on chart review, patient visit & documentation.

## 2025-07-18 NOTE — PROCEDURES
Destruction of Benign Lesion/ porokeratosis v wart L 1st met.head    Date/Time: 7/9/2025 1:00 PM    Performed by: Arely Patel DPM  Authorized by: Arely Patel DPM    Consent Done?:  Yes (Verbal)  Indications:     other  Location:     Lower Extremity:  Foot    Detail:  Left foot  Procedure Details:     Cosmetic?: No      Number of lesions:  1    Destruction method:  Other    Sterile dressings:  Other (comments)    Bleeding:  None     Patient tolerated the procedure well with no immediate complications.